# Patient Record
Sex: FEMALE | Race: WHITE | Employment: UNEMPLOYED | URBAN - METROPOLITAN AREA
[De-identification: names, ages, dates, MRNs, and addresses within clinical notes are randomized per-mention and may not be internally consistent; named-entity substitution may affect disease eponyms.]

---

## 2018-12-29 ENCOUNTER — HOSPITAL ENCOUNTER (EMERGENCY)
Age: 63
Discharge: HOME | End: 2018-12-29
Payer: COMMERCIAL

## 2018-12-29 VITALS
SYSTOLIC BLOOD PRESSURE: 128 MMHG | TEMPERATURE: 98.1 F | WEIGHT: 152 LBS | OXYGEN SATURATION: 99 % | RESPIRATION RATE: 16 BRPM | DIASTOLIC BLOOD PRESSURE: 74 MMHG | HEART RATE: 80 BPM

## 2018-12-29 DIAGNOSIS — N30.01 ACUTE CYSTITIS WITH HEMATURIA: Primary | ICD-10-CM

## 2018-12-29 LAB
BACTERIA: ABNORMAL /HPF
BILIRUBIN URINE: NEGATIVE
BLOOD, URINE: ABNORMAL
CLARITY: CLEAR
COLOR: YELLOW
EPITHELIAL CELLS, UA: ABNORMAL /HPF
GLUCOSE URINE: NEGATIVE MG/DL
KETONES, URINE: NEGATIVE MG/DL
LEUKOCYTE ESTERASE, URINE: ABNORMAL
NITRITE, URINE: NEGATIVE
PH UA: 7 (ref 5–9)
PROTEIN UA: NEGATIVE MG/DL
RBC UA: ABNORMAL /HPF (ref 0–2)
SPECIFIC GRAVITY UA: 1.01 (ref 1–1.03)
UROBILINOGEN, URINE: 0.2 E.U./DL
WBC UA: ABNORMAL /HPF (ref 0–5)

## 2018-12-29 PROCEDURE — 81001 URINALYSIS AUTO W/SCOPE: CPT

## 2018-12-29 PROCEDURE — 87077 CULTURE AEROBIC IDENTIFY: CPT

## 2018-12-29 PROCEDURE — 87186 SC STD MICRODIL/AGAR DIL: CPT

## 2018-12-29 PROCEDURE — 99212 OFFICE O/P EST SF 10 MIN: CPT

## 2018-12-29 PROCEDURE — 87088 URINE BACTERIA CULTURE: CPT

## 2018-12-29 RX ORDER — CEPHALEXIN 500 MG/1
500 CAPSULE ORAL 3 TIMES DAILY
Qty: 21 CAPSULE | Refills: 0 | Status: SHIPPED | OUTPATIENT
Start: 2018-12-29 | End: 2019-01-05

## 2018-12-29 ASSESSMENT — PAIN SCALES - GENERAL: PAINLEVEL_OUTOF10: 4

## 2018-12-29 ASSESSMENT — PAIN DESCRIPTION - DESCRIPTORS: DESCRIPTORS: BURNING

## 2018-12-29 ASSESSMENT — PAIN DESCRIPTION - LOCATION: LOCATION: PERINEUM

## 2018-12-31 LAB
ORGANISM: ABNORMAL
URINE CULTURE, ROUTINE: ABNORMAL
URINE CULTURE, ROUTINE: ABNORMAL

## 2019-01-15 ENCOUNTER — HOSPITAL ENCOUNTER (OUTPATIENT)
Age: 64
Discharge: HOME | End: 2019-01-15
Payer: COMMERCIAL

## 2019-01-15 LAB
ALBUMIN SERPL-MCNC: 4.4 G/DL (ref 3.5–5.2)
ALP BLD-CCNC: 62 U/L (ref 35–104)
ALT SERPL-CCNC: 14 U/L (ref 0–32)
ANION GAP SERPL CALCULATED.3IONS-SCNC: 11 MMOL/L (ref 7–16)
AST SERPL-CCNC: 22 U/L (ref 0–31)
BASOPHILS ABSOLUTE: 0.04 E9/L (ref 0–0.2)
BASOPHILS RELATIVE PERCENT: 1 % (ref 0–2)
BILIRUB SERPL-MCNC: 0.5 MG/DL (ref 0–1.2)
BUN BLDV-MCNC: 13 MG/DL (ref 8–23)
CALCIUM SERPL-MCNC: 9.3 MG/DL (ref 8.6–10.2)
CHLORIDE BLD-SCNC: 102 MMOL/L (ref 98–107)
CHOLESTEROL, FASTING: 195 MG/DL (ref 0–199)
CO2: 28 MMOL/L (ref 22–29)
CREAT SERPL-MCNC: 0.7 MG/DL (ref 0.5–1)
EOSINOPHILS ABSOLUTE: 0.12 E9/L (ref 0.05–0.5)
EOSINOPHILS RELATIVE PERCENT: 3.1 % (ref 0–6)
GFR AFRICAN AMERICAN: >60
GFR NON-AFRICAN AMERICAN: >60 ML/MIN/1.73
GLUCOSE FASTING: 88 MG/DL (ref 74–99)
HCT VFR BLD CALC: 40.7 % (ref 34–48)
HDLC SERPL-MCNC: 60 MG/DL
HEMOGLOBIN: 13.6 G/DL (ref 11.5–15.5)
IMMATURE GRANULOCYTES #: 0 E9/L
IMMATURE GRANULOCYTES %: 0 % (ref 0–5)
LDL CHOLESTEROL CALCULATED: 119 MG/DL (ref 0–99)
LYMPHOCYTES ABSOLUTE: 1.64 E9/L (ref 1.5–4)
LYMPHOCYTES RELATIVE PERCENT: 42.1 % (ref 20–42)
MCH RBC QN AUTO: 30.6 PG (ref 26–35)
MCHC RBC AUTO-ENTMCNC: 33.4 % (ref 32–34.5)
MCV RBC AUTO: 91.5 FL (ref 80–99.9)
MONOCYTES ABSOLUTE: 0.34 E9/L (ref 0.1–0.95)
MONOCYTES RELATIVE PERCENT: 8.7 % (ref 2–12)
NEUTROPHILS ABSOLUTE: 1.76 E9/L (ref 1.8–7.3)
NEUTROPHILS RELATIVE PERCENT: 45.1 % (ref 43–80)
PDW BLD-RTO: 12.8 FL (ref 11.5–15)
PLATELET # BLD: 234 E9/L (ref 130–450)
PMV BLD AUTO: 10.1 FL (ref 7–12)
POTASSIUM SERPL-SCNC: 4.2 MMOL/L (ref 3.5–5)
RBC # BLD: 4.45 E12/L (ref 3.5–5.5)
SODIUM BLD-SCNC: 141 MMOL/L (ref 132–146)
T4 TOTAL: 7.9 MCG/DL (ref 4.5–11.7)
TOTAL PROTEIN: 6.9 G/DL (ref 6.4–8.3)
TRIGLYCERIDE, FASTING: 81 MG/DL (ref 0–149)
TSH SERPL DL<=0.05 MIU/L-ACNC: 2.59 UIU/ML (ref 0.27–4.2)
VITAMIN D 25-HYDROXY: 39 NG/ML (ref 30–100)
VLDLC SERPL CALC-MCNC: 16 MG/DL
WBC # BLD: 3.9 E9/L (ref 4.5–11.5)

## 2019-01-15 PROCEDURE — 84443 ASSAY THYROID STIM HORMONE: CPT

## 2019-01-15 PROCEDURE — 36415 COLL VENOUS BLD VENIPUNCTURE: CPT

## 2019-01-15 PROCEDURE — 82306 VITAMIN D 25 HYDROXY: CPT

## 2019-01-15 PROCEDURE — 85025 COMPLETE CBC W/AUTO DIFF WBC: CPT

## 2019-01-15 PROCEDURE — 84436 ASSAY OF TOTAL THYROXINE: CPT

## 2019-01-15 PROCEDURE — 80053 COMPREHEN METABOLIC PANEL: CPT

## 2019-01-15 PROCEDURE — 80061 LIPID PANEL: CPT

## 2019-02-14 ENCOUNTER — HOSPITAL ENCOUNTER (OUTPATIENT)
Dept: MAMMOGRAPHY | Age: 64
Discharge: HOME | End: 2019-02-16
Payer: COMMERCIAL

## 2019-02-14 DIAGNOSIS — Z12.39 BREAST CANCER SCREENING: ICD-10-CM

## 2019-02-14 PROCEDURE — 77067 SCR MAMMO BI INCL CAD: CPT

## 2020-01-27 ENCOUNTER — HOSPITAL ENCOUNTER (OUTPATIENT)
Age: 65
Discharge: HOME | End: 2020-01-27
Payer: COMMERCIAL

## 2020-01-27 LAB
ALBUMIN SERPL-MCNC: 4.5 G/DL (ref 3.5–5.2)
ALP BLD-CCNC: 78 U/L (ref 35–104)
ALT SERPL-CCNC: 11 U/L (ref 0–32)
ANION GAP SERPL CALCULATED.3IONS-SCNC: 15 MMOL/L (ref 7–16)
AST SERPL-CCNC: 19 U/L (ref 0–31)
BASOPHILS ABSOLUTE: 0.03 E9/L (ref 0–0.2)
BASOPHILS RELATIVE PERCENT: 0.7 % (ref 0–2)
BILIRUB SERPL-MCNC: 0.2 MG/DL (ref 0–1.2)
BUN BLDV-MCNC: 10 MG/DL (ref 8–23)
CALCIUM SERPL-MCNC: 9.7 MG/DL (ref 8.6–10.2)
CHLORIDE BLD-SCNC: 99 MMOL/L (ref 98–107)
CHOLESTEROL, FASTING: 190 MG/DL (ref 0–199)
CO2: 28 MMOL/L (ref 22–29)
CREAT SERPL-MCNC: 0.7 MG/DL (ref 0.5–1)
EOSINOPHILS ABSOLUTE: 0.05 E9/L (ref 0.05–0.5)
EOSINOPHILS RELATIVE PERCENT: 1.2 % (ref 0–6)
GFR AFRICAN AMERICAN: >60
GFR NON-AFRICAN AMERICAN: >60 ML/MIN/1.73
GLUCOSE FASTING: 100 MG/DL (ref 74–99)
HCT VFR BLD CALC: 44 % (ref 34–48)
HDLC SERPL-MCNC: 58 MG/DL
HEMOGLOBIN: 14.6 G/DL (ref 11.5–15.5)
IMMATURE GRANULOCYTES #: 0.01 E9/L
IMMATURE GRANULOCYTES %: 0.2 % (ref 0–5)
LDL CHOLESTEROL CALCULATED: 112 MG/DL (ref 0–99)
LYMPHOCYTES ABSOLUTE: 1.73 E9/L (ref 1.5–4)
LYMPHOCYTES RELATIVE PERCENT: 43 % (ref 20–42)
MCH RBC QN AUTO: 30.3 PG (ref 26–35)
MCHC RBC AUTO-ENTMCNC: 33.2 % (ref 32–34.5)
MCV RBC AUTO: 91.3 FL (ref 80–99.9)
MONOCYTES ABSOLUTE: 0.41 E9/L (ref 0.1–0.95)
MONOCYTES RELATIVE PERCENT: 10.2 % (ref 2–12)
NEUTROPHILS ABSOLUTE: 1.79 E9/L (ref 1.8–7.3)
NEUTROPHILS RELATIVE PERCENT: 44.7 % (ref 43–80)
PDW BLD-RTO: 12.6 FL (ref 11.5–15)
PLATELET # BLD: 234 E9/L (ref 130–450)
PMV BLD AUTO: 9.5 FL (ref 7–12)
POTASSIUM SERPL-SCNC: 4.1 MMOL/L (ref 3.5–5)
RBC # BLD: 4.82 E12/L (ref 3.5–5.5)
SODIUM BLD-SCNC: 142 MMOL/L (ref 132–146)
T4 FREE: 1.16 NG/DL (ref 0.93–1.7)
TOTAL PROTEIN: 7.3 G/DL (ref 6.4–8.3)
TRIGLYCERIDE, FASTING: 101 MG/DL (ref 0–149)
TSH SERPL DL<=0.05 MIU/L-ACNC: 3.48 UIU/ML (ref 0.27–4.2)
VLDLC SERPL CALC-MCNC: 20 MG/DL
WBC # BLD: 4 E9/L (ref 4.5–11.5)

## 2020-01-27 PROCEDURE — 36415 COLL VENOUS BLD VENIPUNCTURE: CPT

## 2020-01-27 PROCEDURE — 85025 COMPLETE CBC W/AUTO DIFF WBC: CPT

## 2020-01-27 PROCEDURE — 80053 COMPREHEN METABOLIC PANEL: CPT

## 2020-01-27 PROCEDURE — 80061 LIPID PANEL: CPT

## 2020-01-27 PROCEDURE — 84443 ASSAY THYROID STIM HORMONE: CPT

## 2020-01-27 PROCEDURE — 84439 ASSAY OF FREE THYROXINE: CPT

## 2020-02-17 ENCOUNTER — HOSPITAL ENCOUNTER (OUTPATIENT)
Dept: MAMMOGRAPHY | Age: 65
Discharge: HOME | End: 2020-02-19
Payer: COMMERCIAL

## 2020-02-17 PROCEDURE — 77063 BREAST TOMOSYNTHESIS BI: CPT

## 2020-10-08 ENCOUNTER — HOSPITAL ENCOUNTER (OUTPATIENT)
Age: 65
Discharge: HOME | End: 2020-10-08
Payer: COMMERCIAL

## 2020-10-08 LAB
CHOLESTEROL, FASTING: 170 MG/DL (ref 0–199)
HDLC SERPL-MCNC: 54 MG/DL
LDL CHOLESTEROL CALCULATED: 105 MG/DL (ref 0–99)
TRIGLYCERIDE, FASTING: 57 MG/DL (ref 0–149)
VLDLC SERPL CALC-MCNC: 11 MG/DL

## 2020-10-08 PROCEDURE — 80061 LIPID PANEL: CPT

## 2020-10-08 PROCEDURE — 36415 COLL VENOUS BLD VENIPUNCTURE: CPT

## 2021-02-15 ENCOUNTER — HOSPITAL ENCOUNTER (OUTPATIENT)
Age: 66
Discharge: HOME | End: 2021-02-15
Payer: COMMERCIAL

## 2021-02-15 LAB
BUN BLDV-MCNC: 11 MG/DL (ref 8–23)
CREAT SERPL-MCNC: 0.8 MG/DL (ref 0.5–1)
GFR AFRICAN AMERICAN: >60
GFR NON-AFRICAN AMERICAN: >60 ML/MIN/1.73

## 2021-02-15 PROCEDURE — 82565 ASSAY OF CREATININE: CPT

## 2021-02-15 PROCEDURE — 84520 ASSAY OF UREA NITROGEN: CPT

## 2021-02-15 PROCEDURE — 36415 COLL VENOUS BLD VENIPUNCTURE: CPT

## 2021-02-16 ENCOUNTER — HOSPITAL ENCOUNTER (OUTPATIENT)
Dept: ULTRASOUND IMAGING | Age: 66
Discharge: HOME | End: 2021-02-16
Payer: COMMERCIAL

## 2021-02-16 ENCOUNTER — HOSPITAL ENCOUNTER (OUTPATIENT)
Dept: CT IMAGING | Age: 66
Discharge: HOME | End: 2021-02-16
Payer: COMMERCIAL

## 2021-02-16 DIAGNOSIS — G45.9 TIA (TRANSIENT ISCHEMIC ATTACK): ICD-10-CM

## 2021-02-16 DIAGNOSIS — G45.9 BRAIN TIA: ICD-10-CM

## 2021-02-16 PROCEDURE — 93880 EXTRACRANIAL BILAT STUDY: CPT

## 2021-02-16 PROCEDURE — 70470 CT HEAD/BRAIN W/O & W/DYE: CPT

## 2021-02-16 PROCEDURE — 6360000004 HC RX CONTRAST MEDICATION: Performed by: RADIOLOGY

## 2021-02-16 RX ADMIN — IOPAMIDOL 75 ML: 755 INJECTION, SOLUTION INTRAVENOUS at 14:40

## 2021-02-19 ENCOUNTER — HOSPITAL ENCOUNTER (OUTPATIENT)
Dept: MAMMOGRAPHY | Age: 66
Discharge: HOME | End: 2021-02-21
Payer: COMMERCIAL

## 2021-02-19 DIAGNOSIS — Z12.39 BREAST SCREENING: ICD-10-CM

## 2021-02-19 PROCEDURE — 77063 BREAST TOMOSYNTHESIS BI: CPT

## 2021-03-09 ENCOUNTER — OFFICE VISIT (OUTPATIENT)
Dept: NEUROLOGY | Age: 66
End: 2021-03-09
Payer: COMMERCIAL

## 2021-03-09 VITALS
TEMPERATURE: 98.1 F | SYSTOLIC BLOOD PRESSURE: 130 MMHG | RESPIRATION RATE: 20 BRPM | HEART RATE: 57 BPM | HEIGHT: 62 IN | OXYGEN SATURATION: 100 % | DIASTOLIC BLOOD PRESSURE: 84 MMHG | WEIGHT: 150 LBS | BODY MASS INDEX: 27.6 KG/M2

## 2021-03-09 DIAGNOSIS — G43.109 OCULAR MIGRAINE: Primary | ICD-10-CM

## 2021-03-09 PROCEDURE — 99204 OFFICE O/P NEW MOD 45 MIN: CPT | Performed by: CLINICAL NURSE SPECIALIST

## 2021-03-09 RX ORDER — ASPIRIN 81 MG/1
81 TABLET, CHEWABLE ORAL DAILY
COMMUNITY

## 2021-03-09 NOTE — PROGRESS NOTES
Mayra Boyce is a 60 y.o. right handed man    Past Medical History:     No past medical history on file. Past Surgical History:     No past surgical history on file. Allergies:     Patient has no known allergies. Medications:     Prior to Admission medications    Medication Sig Start Date End Date Taking? Authorizing Provider   zinc 50 MG CAPS Take by mouth   Yes Historical Provider, MD   aspirin 81 MG chewable tablet Take 81 mg by mouth daily   Yes Historical Provider, MD   calcium carbonate (OSCAL) 500 MG TABS tablet Take 500 mg by mouth daily. Yes Historical Provider, MD   Ginkgo Biloba (GNP GINGKO BILOBA EXTRACT PO) Take  by mouth. Yes Historical Provider, MD   RED YEAST RICE by Does not apply route. Yes Historical Provider, MD   Multiple Vitamins-Minerals (THERAPEUTIC MULTIVITAMIN-MINERALS) tablet Take 1 tablet by mouth daily. Yes Historical Provider, MD       Social History:     Social History     Tobacco Use    Smoking status: Never Smoker    Smokeless tobacco: Never Used   Substance Use Topics    Alcohol use: Yes     Binge frequency: Less than monthly    Drug use: Never       Review of Systems:     No chest pain or palpitations  No SOB  No vertigo, lightheadedness or loss of consciousness  No falls, tripping or stumbling  No incontinence of bowels or bladder  No itching or bruising appreciated    ROS otherwise negative     Family History:     No family history on file. History of Present Illness:     Patient was doing well without complaints. On Feb 8, 2021 she developed flashing in her right eye/visual field followed by confusion for a period of 20 minutes and then a few hours.     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 She followed with her PCP who was concerned for TIA or stroke and started Plavix and obtained CT with contrast and carotid US  She denies vision loss but rather \"flashing lights\" and \"waves\"   No shaded feelings -- no sudden loss of vision - in fact, no loss of vision   No focal arm or leg weakness    She denies any previous headaches but does report frequent feelings of \"pressure\" -- always blamed her sinus and her poor posture when using the computer.    But then goes on to report it happens maybe a day or two a week and hits her at any point during the day -- seems to resolve on its own     Here alone -- great historian    CT with contrast completed - essentially unrevealing  Carotid US unrevealing       Objective:   /84 (Site: Right Upper Arm, Position: Sitting, Cuff Size: Medium Adult)   Pulse 57   Temp 98.1 °F (36.7 °C)   Resp 20   Ht 5' 2\" (1.575 m)   Wt 150 lb (68 kg)   SpO2 100%   BMI 27.44 kg/m²      General appearance: alert, appears stated age and cooperative  Head: Normocephalic, without obvious abnormality, atraumatic  Extremities: no cyanosis or edema  Pulses: 2+ and symmetric  Skin: no rashes or lesions     Mental Status: Alert, oriented to person place and year     Speech: clear  Language: appropriate     Cranial Nerves:  I: smell    II: visual acuity     II: visual fields Full    II: pupils MADALYN - no ALLAN pupil    III,VII: ptosis None   III,IV,VI: extraocular muscles  EOMI without nystagmus    V: mastication Normal   V: facial light touch sensation  Normal   V,VII: corneal reflex  Present   VII: facial muscle function - upper     VII: facial muscle function - lower Normal   VIII: hearing Normal   IX: soft palate elevation  Normal   IX,X: gag reflex    XI: trapezius strength  5/5   XI: sternocleidomastoid strength 5/5   XI: neck extension strength  5/5   XII: tongue strength  Normal     Motor:  5/5 throughout  Normal bulk and tone     Sensory:  LT normal  PP normal   Vibration normal     Coordination:   FN, FFM and MANJIT normal    Gait:  Normal     DTR:   Right Brachioradialis reflex 0  Left Brachioradialis reflex 0  Right Biceps reflex 1+  Left Biceps reflex 1+  Right Quadriceps reflex 1+  Left Quadriceps reflex 1+  Right Achilles reflex 0  Left Achilles reflex 0    No Mathew's     Laboratory/Radiology:     CBC with Differential:    Lab Results   Component Value Date    WBC 4.0 01/27/2020    RBC 4.82 01/27/2020    HGB 14.6 01/27/2020    HCT 44.0 01/27/2020     01/27/2020    MCV 91.3 01/27/2020    MCH 30.3 01/27/2020    MCHC 33.2 01/27/2020    RDW 12.6 01/27/2020    LYMPHOPCT 43.0 01/27/2020    MONOPCT 10.2 01/27/2020    BASOPCT 0.7 01/27/2020    MONOSABS 0.41 01/27/2020    LYMPHSABS 1.73 01/27/2020    EOSABS 0.05 01/27/2020    BASOSABS 0.03 01/27/2020     CMP:    Lab Results   Component Value Date     01/27/2020    K 4.1 01/27/2020    CL 99 01/27/2020    CO2 28 01/27/2020    BUN 11 02/15/2021    CREATININE 0.8 02/15/2021    GFRAA >60 02/15/2021    LABGLOM >60 02/15/2021    GLUCOSE 88 11/25/2013    PROT 7.3 01/27/2020    LABALBU 4.5 01/27/2020    CALCIUM 9.7 01/27/2020    BILITOT 0.2 01/27/2020    ALKPHOS 78 01/27/2020    AST 19 01/27/2020    ALT 11 01/27/2020     Carotid US 2/2021  There is no measurable stenosis of the right or left common or internal  carotid arteries. Bilateral vertebral arteries are patent with flow in the normal direction. CT Head with and without  1. There is no acute intracranial hemorrhage or intracranial abnormality  2. Frontal lobe atrophy. .  3. There is no enhancing lesion or vascular malformation. 4. Mild periventricular leukomalacia. I independently reviewed the labs and imaging studies today.      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 which caused the brief episode of language difficulties   Ophthalmology found no difficulties with her eye -- carotid US unrevealing as was CT with contrast    I also question her weekly feelings of \"pressure\" are related to mild migraine s/s as opposed to sinus and posture complaints     Plan:     We did discuss s/s related to migraines  Her neuro exam is unrevealing     She was provided prn Nurtec to try should these spells return   We also discussed keeping a log regarding her pressure feelings and see if there are any precipitating factors     She will notify me after using Nurtec or if she developed further s/s    She is leaving for [facility] soon and will notify me upon her return for re-kailee Chau  11:50 AM  3/9/2021

## 2022-02-21 ENCOUNTER — HOSPITAL ENCOUNTER (OUTPATIENT)
Dept: MAMMOGRAPHY | Age: 67
Discharge: HOME | End: 2022-02-23
Payer: MEDICARE

## 2022-02-21 VITALS — WEIGHT: 157 LBS | HEIGHT: 62 IN | BODY MASS INDEX: 28.89 KG/M2

## 2022-02-21 DIAGNOSIS — Z12.31 ENCOUNTER FOR SCREENING MAMMOGRAM FOR MALIGNANT NEOPLASM OF BREAST: ICD-10-CM

## 2022-02-21 PROCEDURE — 77063 BREAST TOMOSYNTHESIS BI: CPT

## 2023-04-05 ENCOUNTER — HOSPITAL ENCOUNTER (OUTPATIENT)
Dept: MAMMOGRAPHY | Age: 68
Discharge: HOME | End: 2023-04-07
Payer: MEDICARE

## 2023-04-05 VITALS — WEIGHT: 157 LBS | HEIGHT: 62 IN | BODY MASS INDEX: 28.89 KG/M2

## 2023-04-05 DIAGNOSIS — Z12.31 ENCOUNTER FOR SCREENING MAMMOGRAM FOR MALIGNANT NEOPLASM OF BREAST: ICD-10-CM

## 2023-04-05 PROCEDURE — 77063 BREAST TOMOSYNTHESIS BI: CPT

## 2024-04-08 ENCOUNTER — HOSPITAL ENCOUNTER (OUTPATIENT)
Dept: MAMMOGRAPHY | Age: 69
Discharge: HOME | End: 2024-04-10
Attending: OBSTETRICS & GYNECOLOGY
Payer: MEDICARE

## 2024-04-08 VITALS — WEIGHT: 166 LBS | BODY MASS INDEX: 30.55 KG/M2 | HEIGHT: 62 IN

## 2024-04-08 DIAGNOSIS — Z12.31 ENCOUNTER FOR SCREENING MAMMOGRAM FOR MALIGNANT NEOPLASM OF BREAST: ICD-10-CM

## 2024-04-08 PROCEDURE — 77063 BREAST TOMOSYNTHESIS BI: CPT

## 2025-04-09 ENCOUNTER — HOSPITAL ENCOUNTER (OUTPATIENT)
Dept: MAMMOGRAPHY | Age: 70
Discharge: HOME | End: 2025-04-11
Attending: OBSTETRICS & GYNECOLOGY
Payer: MEDICARE

## 2025-04-09 DIAGNOSIS — Z12.31 VISIT FOR SCREENING MAMMOGRAM: ICD-10-CM

## 2025-04-09 PROCEDURE — 77063 BREAST TOMOSYNTHESIS BI: CPT
